# Patient Record
Sex: FEMALE | Race: OTHER | HISPANIC OR LATINO
[De-identification: names, ages, dates, MRNs, and addresses within clinical notes are randomized per-mention and may not be internally consistent; named-entity substitution may affect disease eponyms.]

---

## 2021-10-29 PROBLEM — Z00.00 ENCOUNTER FOR PREVENTIVE HEALTH EXAMINATION: Status: ACTIVE | Noted: 2021-10-29

## 2021-11-04 ENCOUNTER — APPOINTMENT (OUTPATIENT)
Dept: GASTROENTEROLOGY | Facility: CLINIC | Age: 46
End: 2021-11-04
Payer: MEDICAID

## 2021-11-04 VITALS
DIASTOLIC BLOOD PRESSURE: 80 MMHG | WEIGHT: 154 LBS | HEART RATE: 95 BPM | TEMPERATURE: 97 F | RESPIRATION RATE: 14 BRPM | SYSTOLIC BLOOD PRESSURE: 110 MMHG | BODY MASS INDEX: 30.23 KG/M2 | OXYGEN SATURATION: 98 % | HEIGHT: 60 IN

## 2021-11-04 DIAGNOSIS — K21.9 GASTRO-ESOPHAGEAL REFLUX DISEASE W/OUT ESOPHAGITIS: ICD-10-CM

## 2021-11-04 PROCEDURE — 99204 OFFICE O/P NEW MOD 45 MIN: CPT

## 2021-11-08 NOTE — REASON FOR VISIT
[Initial Evaluation] : an initial evaluation [FreeTextEntry1] : multiple GI complaints  Yes/september 2016 september 2016/No

## 2021-11-08 NOTE — PHYSICAL EXAM
[General Appearance - Alert] : alert [General Appearance - In No Acute Distress] : in no acute distress [General Appearance - Well Nourished] : well nourished [Sclera] : the sclera and conjunctiva were normal [Outer Ear] : the ears and nose were normal in appearance [Neck Appearance] : the appearance of the neck was normal [Neck Cervical Mass (___cm)] : no neck mass was observed [Heart Rate And Rhythm] : heart rate was normal and rhythm regular [Edema] : there was no peripheral edema [Bowel Sounds] : normal bowel sounds [Abdomen Soft] : soft [Abdomen Tenderness] : non-tender [Abnormal Walk] : normal gait [Skin Color & Pigmentation] : normal skin color and pigmentation [Skin Turgor] : normal skin turgor [] : no rash [No Focal Deficits] : no focal deficits [Oriented To Time, Place, And Person] : oriented to person, place, and time [Impaired Insight] : insight and judgment were intact [Affect] : the affect was normal [FreeTextEntry1] : defer to colonoscopy

## 2021-11-08 NOTE — HISTORY OF PRESENT ILLNESS
[de-identified] : 46F with gastroparesis, asthma, HTN, pre-diabetes, HLD and LUIS referred for evaluation of multiple GI complaints. \par \par Pt reports her gallbladder was removed in 1999 and felt good for two years. 2 years later acid reflux began \par started gaining weight \par lactose intolerant \par takes Omeprazole 20 mg on a daily basis, if does not take it then cant sleep. food comes up if bends over \par symptoms way worse since last EGD (~2012)\par generalized pain all over - sometimes to point where cant walk \par constipation often, sees blood in stool (bright red blood)\par 1-2 years ago gained over 40 pounds then recently lost 8-10 pounds \par \par never had colonoscopy \par \par FHX: ? grandmother stomach cancer\par Etoh: none\par smoking: none\par drug use: none \par \par last EGD: 8 years ago, saw processed food 4 days prior and diagnosed with gastroparesis \par blood work 1 week ago \par \par stays away from food triggers \par allows 3-4 hours before laying down after last meal

## 2021-11-08 NOTE — ASSESSMENT
[FreeTextEntry1] : 46F with gastroparesis, asthma, HTN, pre-diabetes, HLD and LUIS referred for evaluation of multiple GI complaints. \par \par Acid reflux/abdominal pain \par - recommend EGD for further evaluation considering severity of symptoms, r/a/i/b discussed and patient agreeable\par - retrieve recent blood work just had 1 week ago with PCP to review and determine if anything additional needs to be obtained \par - increase Omeprazole to 40mg daily in interim \par - continue lifestyle habits such as avoiding eating late and allowing 3-4 hours before laying down after eating \par \par Constipation/blood in stool \par - recommend colonoscopy at time of EGD, r/a/i/b discussed and patient agreeable \par - pt denies being anemic but need to review recent labs when received\par - bowel prep to be provided \par - discussed adequate water, hydration and adding either magnesium or MiraLAX daily to routine\par \par f/u post scopes

## 2021-12-17 DIAGNOSIS — Z12.11 ENCOUNTER FOR SCREENING FOR MALIGNANT NEOPLASM OF COLON: ICD-10-CM

## 2022-01-24 ENCOUNTER — APPOINTMENT (OUTPATIENT)
Dept: GASTROENTEROLOGY | Facility: CLINIC | Age: 47
End: 2022-01-24

## 2022-03-01 RX ORDER — POLYETHYLENE GLYCOL 3350 AND ELECTROLYTES WITH LEMON FLAVOR 236; 22.74; 6.74; 5.86; 2.97 G/4L; G/4L; G/4L; G/4L; G/4L
236 POWDER, FOR SOLUTION ORAL
Qty: 1 | Refills: 0 | Status: ACTIVE | COMMUNITY
Start: 2021-12-17 | End: 1900-01-01

## 2022-03-24 ENCOUNTER — RX RENEWAL (OUTPATIENT)
Age: 47
End: 2022-03-24

## 2022-03-24 RX ORDER — OMEPRAZOLE 40 MG/1
40 CAPSULE, DELAYED RELEASE ORAL DAILY
Qty: 30 | Refills: 2 | Status: ACTIVE | COMMUNITY
Start: 2021-11-04 | End: 1900-01-01

## 2022-06-16 ENCOUNTER — APPOINTMENT (OUTPATIENT)
Dept: GASTROENTEROLOGY | Facility: CLINIC | Age: 47
End: 2022-06-16